# Patient Record
Sex: FEMALE | Race: WHITE
[De-identification: names, ages, dates, MRNs, and addresses within clinical notes are randomized per-mention and may not be internally consistent; named-entity substitution may affect disease eponyms.]

---

## 2018-02-14 ENCOUNTER — HOSPITAL ENCOUNTER (OUTPATIENT)
Dept: HOSPITAL 25 - OR | Age: 38
Discharge: HOME | End: 2018-02-14
Attending: SURGERY
Payer: MEDICARE

## 2018-02-14 VITALS — SYSTOLIC BLOOD PRESSURE: 112 MMHG | DIASTOLIC BLOOD PRESSURE: 76 MMHG

## 2018-02-14 DIAGNOSIS — Z87.891: ICD-10-CM

## 2018-02-14 DIAGNOSIS — L90.5: ICD-10-CM

## 2018-02-14 DIAGNOSIS — R10.11: Primary | ICD-10-CM

## 2018-02-14 DIAGNOSIS — E03.9: ICD-10-CM

## 2018-02-14 DIAGNOSIS — I10: ICD-10-CM

## 2018-02-14 DIAGNOSIS — M10.9: ICD-10-CM

## 2018-02-14 DIAGNOSIS — Z98.84: ICD-10-CM

## 2018-02-14 DIAGNOSIS — E78.5: ICD-10-CM

## 2018-02-14 DIAGNOSIS — F41.8: ICD-10-CM

## 2018-02-14 PROCEDURE — 81025 URINE PREGNANCY TEST: CPT

## 2018-02-14 RX ADMIN — FENTANYL CITRATE PRN MCG: 0.05 INJECTION, SOLUTION INTRAMUSCULAR; INTRAVENOUS at 14:41

## 2018-02-14 RX ADMIN — FENTANYL CITRATE PRN MCG: 0.05 INJECTION, SOLUTION INTRAMUSCULAR; INTRAVENOUS at 14:28

## 2018-02-14 RX ADMIN — FENTANYL CITRATE PRN MCG: 0.05 INJECTION, SOLUTION INTRAMUSCULAR; INTRAVENOUS at 14:46

## 2018-02-14 RX ADMIN — FENTANYL CITRATE PRN MCG: 0.05 INJECTION, SOLUTION INTRAMUSCULAR; INTRAVENOUS at 14:33

## 2018-03-11 NOTE — OP
*** AMENDED REPORT NOW INCLUDES DATE OF OPERATION - ESIGNED BEFORE ADJUSTMENT **
*



CC:  [facility]; Dr. Miller Estrada *

 

DATE OF OPERATION:  02/14/18 - SDS

 

DATE OF BIRTH:  [birth year]

 

SURGEON:  Jose Rain MD

 

ASSISTANT:  SHANAE Pinon

 

ANESTHESIOLOGIST:

 

ANESTHESIA:  Local MAC anesthesia converted to general with laryngeal mask 
airway.

 

PRE-OP DIAGNOSIS:  Right upper quadrant pain, rule out incisional hernia.

 

POST-OP DIAGNOSIS:  Right upper quadrant pain.

 

OPERATIVE PROCEDURE:  Exploration of previous right upper quadrant port site 
incision.

 

ESTIMATED BLOOD LOSS:  Minimal blood loss.

 

SPECIMEN:  None.

 

FLUIDS:  1500 cc of crystalloid fluid given.

 

DRAINS:  None.

 

DESCRIPTION OF PROCEDURE:  Ms. Muñoz was taken to the operating room, placed on 
the operating table in a supine position.  Preoperative antibiotics were given. 
Sequential devices were placed in the bilateral lower extremities.  General 
sedation was induced.  The patient's abdomen was prepped and draped in a 
standard surgical fashion.  A time-out was performed.

 

A previously marked area where the point of maximal tenderness was identified, 
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 
gastric bypass many years ago.  We injected at this scar site and opened this 
up and approximately a 5 cm in transverse fashion.  We deepened this down 
through the subcutaneous fat sown to the anterior fascia.  No defect was noted.
  There was some scaring.  We did incise the fascia to see if we could 
appreciate any abnormalities within this area as the patient had distinct point 
tenderness at this area.  No findings were appreciated.  We reapproximated the 
fascia with a 0 Vicryl suture. We injected Marcaine at this site and 
reapproximated the incision with 3-0 Vicryl sutures followed by 4-0 Monocryl 
subcuticular suture.  Steri-Strips and sterile dressing were applied.  The 
patient tolerated the procedure well, was transferred to PACU in stable 
condition.

 

 [id number]/[facility] #: [id number]

[facility]

## 2019-09-16 ENCOUNTER — HOSPITAL ENCOUNTER (INPATIENT)
Dept: HOSPITAL 25 - MCHOB | Age: 39
LOS: 2 days | Discharge: HOME | End: 2019-09-18
Attending: OBSTETRICS & GYNECOLOGY | Admitting: OBSTETRICS & GYNECOLOGY
Payer: MEDICARE

## 2019-09-16 DIAGNOSIS — L82.1: ICD-10-CM

## 2019-09-16 DIAGNOSIS — Z30.2: ICD-10-CM

## 2019-09-16 DIAGNOSIS — O99.89: ICD-10-CM

## 2019-09-16 DIAGNOSIS — J45.909: ICD-10-CM

## 2019-09-16 DIAGNOSIS — O34.211: ICD-10-CM

## 2019-09-16 DIAGNOSIS — Z87.891: ICD-10-CM

## 2019-09-16 DIAGNOSIS — Z3A.39: ICD-10-CM

## 2019-09-16 DIAGNOSIS — Q87.81: ICD-10-CM

## 2019-09-16 DIAGNOSIS — E03.9: ICD-10-CM

## 2019-09-16 DIAGNOSIS — O32.8XX0: Primary | ICD-10-CM

## 2019-09-16 PROCEDURE — 36415 COLL VENOUS BLD VENIPUNCTURE: CPT

## 2019-09-16 PROCEDURE — 86901 BLOOD TYPING SEROLOGIC RH(D): CPT

## 2019-09-16 PROCEDURE — 86900 BLOOD TYPING SEROLOGIC ABO: CPT

## 2019-09-16 PROCEDURE — 4A1HXCZ MONITORING OF PRODUCTS OF CONCEPTION, CARDIAC RATE, EXTERNAL APPROACH: ICD-10-PCS | Performed by: OBSTETRICS & GYNECOLOGY

## 2019-09-16 PROCEDURE — 0HB7XZZ EXCISION OF ABDOMEN SKIN, EXTERNAL APPROACH: ICD-10-PCS | Performed by: OBSTETRICS & GYNECOLOGY

## 2019-09-16 PROCEDURE — 80307 DRUG TEST PRSMV CHEM ANLYZR: CPT

## 2019-09-16 PROCEDURE — 85025 COMPLETE CBC W/AUTO DIFF WBC: CPT

## 2019-09-16 PROCEDURE — 0UB70ZZ EXCISION OF BILATERAL FALLOPIAN TUBES, OPEN APPROACH: ICD-10-PCS | Performed by: OBSTETRICS & GYNECOLOGY

## 2019-09-16 PROCEDURE — 86850 RBC ANTIBODY SCREEN: CPT

## 2019-09-16 PROCEDURE — 88305 TISSUE EXAM BY PATHOLOGIST: CPT

## 2019-09-16 PROCEDURE — 88302 TISSUE EXAM BY PATHOLOGIST: CPT

## 2019-09-16 RX ADMIN — HYDROCODONE BITARTRATE AND ACETAMINOPHEN PRN TAB: 5; 325 TABLET ORAL at 17:55

## 2019-09-16 RX ADMIN — DOCUSATE SODIUM SCH MG: 100 CAPSULE, LIQUID FILLED ORAL at 14:24

## 2019-09-16 RX ADMIN — ACETAMINOPHEN SCH: 325 TABLET ORAL at 11:20

## 2019-09-16 RX ADMIN — HYDROCODONE BITARTRATE AND ACETAMINOPHEN PRN TAB: 5; 325 TABLET ORAL at 14:24

## 2019-09-16 RX ADMIN — DOCUSATE SODIUM SCH MG: 100 CAPSULE, LIQUID FILLED ORAL at 20:33

## 2019-09-16 RX ADMIN — ACETAMINOPHEN SCH MG: 325 TABLET ORAL at 17:55

## 2019-09-16 RX ADMIN — HYDROCODONE BITARTRATE AND ACETAMINOPHEN PRN TAB: 5; 325 TABLET ORAL at 22:54

## 2019-09-16 RX ADMIN — SIMETHICONE CHEW TAB 80 MG SCH MG: 80 TABLET ORAL at 20:33

## 2019-09-16 RX ADMIN — ACETAMINOPHEN SCH MG: 325 TABLET ORAL at 23:51

## 2019-09-16 RX ADMIN — SIMETHICONE CHEW TAB 80 MG SCH MG: 80 TABLET ORAL at 12:50

## 2019-09-17 LAB
BASOPHILS # BLD AUTO: 0 10^3/UL (ref 0–0.2)
EOSINOPHIL # BLD AUTO: 0.1 10^3/UL (ref 0–0.6)
HCT VFR BLD AUTO: 34 % (ref 35–47)
HGB BLD-MCNC: 11.9 G/DL (ref 12–16)
LYMPHOCYTES # BLD AUTO: 2.2 10^3/UL (ref 1–4.8)
MCH RBC QN AUTO: 31 PG (ref 27–31)
MCHC RBC AUTO-ENTMCNC: 35 G/DL (ref 31–36)
MCV RBC AUTO: 89 FL (ref 80–97)
MONOCYTES # BLD AUTO: 0.8 10^3/UL (ref 0–0.8)
NEUTROPHILS # BLD AUTO: 6.9 10^3/UL (ref 1.5–7.7)
NRBC # BLD AUTO: 0 10^3/UL
NRBC BLD QL AUTO: 0
PLATELET # BLD AUTO: 218 10^3/UL (ref 150–450)
RBC # BLD AUTO: 3.84 10^6 /UL (ref 3.7–4.87)
WBC # BLD AUTO: 10.1 10^3/UL (ref 3.5–10.8)

## 2019-09-17 RX ADMIN — Medication SCH UNITS: at 20:46

## 2019-09-17 RX ADMIN — FAMOTIDINE SCH MG: 20 TABLET, FILM COATED ORAL at 02:02

## 2019-09-17 RX ADMIN — ACETAMINOPHEN PRN MG: 325 TABLET ORAL at 22:58

## 2019-09-17 RX ADMIN — OXYCODONE HYDROCHLORIDE AND ACETAMINOPHEN PRN TAB: 5; 325 TABLET ORAL at 03:57

## 2019-09-17 RX ADMIN — SIMETHICONE CHEW TAB 80 MG SCH MG: 80 TABLET ORAL at 17:29

## 2019-09-17 RX ADMIN — SIMETHICONE CHEW TAB 80 MG SCH: 80 TABLET ORAL at 08:28

## 2019-09-17 RX ADMIN — Medication SCH TAB: at 08:37

## 2019-09-17 RX ADMIN — SIMETHICONE CHEW TAB 80 MG SCH MG: 80 TABLET ORAL at 20:45

## 2019-09-17 RX ADMIN — CYANOCOBALAMIN TAB 500 MCG SCH MCG: 500 TAB at 20:45

## 2019-09-17 RX ADMIN — FAMOTIDINE SCH MG: 20 TABLET, FILM COATED ORAL at 20:45

## 2019-09-17 RX ADMIN — LEVOTHYROXINE SODIUM SCH MCG: 137 TABLET ORAL at 02:03

## 2019-09-17 RX ADMIN — OXYCODONE HYDROCHLORIDE AND ACETAMINOPHEN PRN TAB: 5; 325 TABLET ORAL at 12:51

## 2019-09-17 RX ADMIN — CYANOCOBALAMIN TAB 500 MCG SCH MCG: 500 TAB at 02:42

## 2019-09-17 RX ADMIN — ACETAMINOPHEN PRN MG: 325 TABLET ORAL at 18:58

## 2019-09-17 RX ADMIN — CETIRIZINE HYDROCHLORIDE SCH MG: 10 TABLET, FILM COATED ORAL at 20:45

## 2019-09-17 RX ADMIN — DOCUSATE SODIUM SCH MG: 100 CAPSULE, LIQUID FILLED ORAL at 20:45

## 2019-09-17 RX ADMIN — Medication SCH TAB: at 02:42

## 2019-09-17 RX ADMIN — SIMETHICONE CHEW TAB 80 MG SCH MG: 80 TABLET ORAL at 08:37

## 2019-09-17 RX ADMIN — SIMETHICONE CHEW TAB 80 MG SCH MG: 80 TABLET ORAL at 12:38

## 2019-09-17 RX ADMIN — CETIRIZINE HYDROCHLORIDE SCH MG: 10 TABLET, FILM COATED ORAL at 02:02

## 2019-09-17 RX ADMIN — Medication SCH UNITS: at 02:41

## 2019-09-17 RX ADMIN — ACETAMINOPHEN SCH: 325 TABLET ORAL at 08:28

## 2019-09-17 RX ADMIN — OXYCODONE HYDROCHLORIDE AND ACETAMINOPHEN PRN TAB: 5; 325 TABLET ORAL at 08:37

## 2019-09-17 RX ADMIN — FAMOTIDINE SCH MG: 20 TABLET, FILM COATED ORAL at 08:37

## 2019-09-17 RX ADMIN — LEVOTHYROXINE SODIUM SCH MCG: 137 TABLET ORAL at 20:46

## 2019-09-17 RX ADMIN — DOCUSATE SODIUM SCH MG: 100 CAPSULE, LIQUID FILLED ORAL at 08:37

## 2019-09-17 RX ADMIN — ACETAMINOPHEN PRN MG: 325 TABLET ORAL at 06:26

## 2019-09-17 RX ADMIN — DOCUSATE SODIUM SCH MG: 100 CAPSULE, LIQUID FILLED ORAL at 15:22

## 2019-09-17 NOTE — OP
DATE OF OPERATION:  19 - ROOM #117

 

DATE OF BIRTH:  [birth year]

 

SURGEON:  Reid Billings, 

 

ASSISTANT:  Ross Guadalupe MD

 

ANESTHESIA:  Spinal.

 

PRE-OP DIAGNOSES:  Single intrauterine pregnancy at term, history of prior 
 section x2.

 

POST-OP DIAGNOSES:  Single intrauterine pregnancy at term, history of prior 
 section x2, delivered.

 

OPERATIVE PROCEDURE:  Repeat low transverse  section via Pfannenstiel 
incision.

 

ESTIMATED BLOOD LOSS:  The total estimated blood loss was 600 mL.

 

URINE OUTPUT:  300 mL.

 

IV FLUIDS:  2400 mL.

 

SPECIMENS:  Placenta discarded, bilateral fallopian tubes sent to Pathology, 
skin lesion consistent with seborrheic keratosis excised and sent to pathology

 

COMPLICATIONS:  There were no complications.

 

INDICATIONS AND CONSENT:  The patient had a history of two prior  
sections and wished to proceed with repeat delivery by  section.  The 
patient also desired to undergo bilateral tubal ligation and removal of skin 
lesion from abdomen in the midline just inferior to skin incision.  The patient 
understood that the risks of  section include, but are not limited to 
visceral or vascular injury, infection, blood loss, need for transfusion, 
prolonged hospitalization, and reoperation. The patient stated understanding 
and desired to proceed.  All questions were answered and written consent was 
obtained.

 

FINDINGS:  Female infant in footling breech position, Apgars 9 and 9, weight 7 
pounds 0 ounces or 3168 g.  Normal uterus, fallopian tubes, and ovaries 
bilaterally. Benign in appearance skin lesion.

 

DESCRIPTION OF PROCEDURE:  The patient was taken to the operating room where 
spinal anesthesia was administered and found to be adequate.  2 g of cefoxitin 
was given for infection prophylaxis.  She was prepped and draped in the dorsal 
supine position with a leftward tilt.  A Pfannenstiel skin incision was made 
with the scalpel.  The skin lesion just inferior to the incision was excised at 
this point without complication.  The specimen was handed off and sent for 
permanent pathology.  The incision was carried down to the fascia with a Bovie. 
The fascia was incised and extended laterally.  The inferior aspect of the 
fascia was grasped with Kocher clamps. The underlying rectus muscle and 
pyramidalis was dissected off sharply with Priest scissors.  In a similar fashion
, the superior aspect of the fascia was elevated with Kocher and the rectus 
muscle was also dissected off.  Hemostasis was achieved with the Bovie.  The 
rectus was  in the midline down to the level of the pubic symphysis.  
Preperitoneal fatty tissue was bluntly dissected to expose the peritoneum.  The 
peritoneum was found to be free of adherent bowel and entered sharply with 
scissors.  The peritoneal incision was extended superiorly and inferiorly to 
the bladder reflection with good visualization of the bladder.  The bladder 
blade was inserted and vesicouterine peritoneum identified.  Intraabdominal 
survey revealed scant clear peritoneal fluid and thinned out lower uterine 
segment. Moderate adhesive disease was noted on the anterior wall of the uterus 
between the serosa of the uterus and the bladder.  The vesicouterine peritoneum 
was opened with scissors and a bladder flap was carefully developed by 
dissecting these adhesions and releasing them inferiorly.  The bladder blade 
was then repositioned to keep the bladder out of the operative field.  The 
lower uterine segment was incised with a scalpel.  The amniotic sac was 
ruptured with an Allis clamp and clear fluid was noted.  The uterine incision 
was extended bluntly in a cephalocaudad direction.  The fetus was in footling 
breech presentation.  The two feet were grasped and delivered without 
difficulty to the level of the sacrum.  Careful downward traction was applied 
at the level of the sacrum and the fetal torso was delivered. The fetus was 
gently rotated from side to side and the left, then right shoulders were 
delivered and arms swept out pf the abdomen underneath the fetus.  The Smellie-
Veit maneuver was used to deliver the head with proper flexion of the fetal 
head and the infant was then delivered with no difficulty. The mouth and nose 
were suctioned with a bulb.  The cord was clamped and cut.  The infant was 
handed off to the pediatrician.  IV oxytocin was initiated to facilitate 
uterine contractions.  The placenta was delivered intact with manual massage of 
the uterine fundus.  The uterus was then exteriorized.  The inside of the 
uterus was gently wiped with a lap sponge to assure complete removal of 
placental membranes.  The uterine incision was closed with 0 Vicryl suture in a 
running locked fashion.  An imbricating suture was then applied as well using a 
0 Vicryl again.  The ovaries and tubes were found to be normal bilaterally.  
Attention was then turned to the bilateral fallopian tubes for tubal ligation. 
The right fallopian tube was grasped with a Amy spanning from a middle 
portion of the tube past the entire fimbriated portion.  A free Vicryl tie was 
used to suture ligate the fallopian tube proximally.  The Tayler was released, 
the fallopian tube was resected with the Metzenbaum scissors and adequate 
hemostasis was noted.  This was then also performed on the left fallopian tube.
  Both fallopian tube sites of transection were then re-inspected and there was 
no bleeding noted.  The uterus, remaining tubal portions and ovaries were then 
returned to the abdominal cavity after being inspected and found to be normal.  
The blood clots and fluid were wiped out of the abdomen and pelvis with moist 
laparotomy sponges.  The uterine incision was inspected and good hemostasis was 
noted.  The peritoneum was closed with a 3-0 Vicryl suture in a continuous 
running fashion.  The fascial layer was closed with 0 Vicryl in a continuous 
running fashion.  The subcutaneous fat was closed with 3-0 Vicryl using 4 
interrupted sutures.  The skin was then closed with 4-0 Monocryl suture in a 
subcuticular fashion.  The patient tolerated the procedure well.  All counts 
were correct x2.  The patient was taken to the recovery room in a stable 
condition.



Arie Billings DO

OBGYN

 

 [id number]/[facility] #: [id number]

[facility]

## 2019-09-18 VITALS — DIASTOLIC BLOOD PRESSURE: 71 MMHG | SYSTOLIC BLOOD PRESSURE: 129 MMHG

## 2019-09-18 RX ADMIN — DOCUSATE SODIUM SCH MG: 100 CAPSULE, LIQUID FILLED ORAL at 07:49

## 2019-09-18 RX ADMIN — Medication SCH TAB: at 07:49

## 2019-09-18 RX ADMIN — OXYCODONE HYDROCHLORIDE AND ACETAMINOPHEN PRN TAB: 5; 325 TABLET ORAL at 03:23

## 2019-09-18 RX ADMIN — FAMOTIDINE SCH MG: 20 TABLET, FILM COATED ORAL at 07:49

## 2019-09-18 RX ADMIN — OXYCODONE HYDROCHLORIDE AND ACETAMINOPHEN PRN TAB: 5; 325 TABLET ORAL at 07:43

## 2019-09-18 RX ADMIN — SIMETHICONE CHEW TAB 80 MG SCH MG: 80 TABLET ORAL at 11:47

## 2019-09-18 RX ADMIN — SIMETHICONE CHEW TAB 80 MG SCH MG: 80 TABLET ORAL at 07:49
